# Patient Record
Sex: MALE | Race: WHITE | URBAN - METROPOLITAN AREA
[De-identification: names, ages, dates, MRNs, and addresses within clinical notes are randomized per-mention and may not be internally consistent; named-entity substitution may affect disease eponyms.]

---

## 2017-01-01 ENCOUNTER — RADIANT APPOINTMENT (OUTPATIENT)
Dept: CARDIOLOGY | Facility: CLINIC | Age: 72
End: 2017-01-01
Attending: FAMILY MEDICINE
Payer: COMMERCIAL

## 2017-01-01 ENCOUNTER — TELEPHONE (OUTPATIENT)
Dept: FAMILY MEDICINE | Facility: CLINIC | Age: 72
End: 2017-01-01

## 2017-01-01 ENCOUNTER — MEDICAL CORRESPONDENCE (OUTPATIENT)
Dept: HEALTH INFORMATION MANAGEMENT | Facility: CLINIC | Age: 72
End: 2017-01-01

## 2017-01-01 ENCOUNTER — TELEPHONE (OUTPATIENT)
Dept: NURSING | Facility: CLINIC | Age: 72
End: 2017-01-01

## 2017-01-01 ENCOUNTER — RADIANT APPOINTMENT (OUTPATIENT)
Dept: GENERAL RADIOLOGY | Facility: CLINIC | Age: 72
End: 2017-01-01
Attending: FAMILY MEDICINE
Payer: COMMERCIAL

## 2017-01-01 ENCOUNTER — OFFICE VISIT (OUTPATIENT)
Dept: URGENT CARE | Facility: URGENT CARE | Age: 72
End: 2017-01-01
Payer: COMMERCIAL

## 2017-01-01 ENCOUNTER — OFFICE VISIT (OUTPATIENT)
Dept: FAMILY MEDICINE | Facility: CLINIC | Age: 72
End: 2017-01-01
Payer: COMMERCIAL

## 2017-01-01 VITALS
SYSTOLIC BLOOD PRESSURE: 116 MMHG | OXYGEN SATURATION: 86 % | WEIGHT: 255 LBS | HEART RATE: 88 BPM | TEMPERATURE: 97 F | DIASTOLIC BLOOD PRESSURE: 64 MMHG | BODY MASS INDEX: 38.77 KG/M2

## 2017-01-01 VITALS
WEIGHT: 260 LBS | OXYGEN SATURATION: 90 % | SYSTOLIC BLOOD PRESSURE: 150 MMHG | TEMPERATURE: 97.7 F | BODY MASS INDEX: 39.53 KG/M2 | HEART RATE: 84 BPM | DIASTOLIC BLOOD PRESSURE: 70 MMHG

## 2017-01-01 VITALS
HEART RATE: 80 BPM | OXYGEN SATURATION: 95 % | BODY MASS INDEX: 38.7 KG/M2 | SYSTOLIC BLOOD PRESSURE: 140 MMHG | DIASTOLIC BLOOD PRESSURE: 69 MMHG | WEIGHT: 254.5 LBS | TEMPERATURE: 98.1 F

## 2017-01-01 VITALS
SYSTOLIC BLOOD PRESSURE: 117 MMHG | HEART RATE: 101 BPM | TEMPERATURE: 97.5 F | OXYGEN SATURATION: 91 % | DIASTOLIC BLOOD PRESSURE: 69 MMHG | BODY MASS INDEX: 36.49 KG/M2 | WEIGHT: 240 LBS

## 2017-01-01 VITALS
BODY MASS INDEX: 37.74 KG/M2 | SYSTOLIC BLOOD PRESSURE: 114 MMHG | HEIGHT: 68 IN | DIASTOLIC BLOOD PRESSURE: 46 MMHG | WEIGHT: 249 LBS | HEART RATE: 90 BPM | TEMPERATURE: 97.6 F | OXYGEN SATURATION: 91 %

## 2017-01-01 DIAGNOSIS — I10 ESSENTIAL HYPERTENSION WITH GOAL BLOOD PRESSURE LESS THAN 140/90: ICD-10-CM

## 2017-01-01 DIAGNOSIS — E11.21 TYPE 2 DIABETES MELLITUS WITH DIABETIC NEPHROPATHY, WITHOUT LONG-TERM CURRENT USE OF INSULIN (H): ICD-10-CM

## 2017-01-01 DIAGNOSIS — R06.2 WHEEZING: ICD-10-CM

## 2017-01-01 DIAGNOSIS — R05.9 COUGH: ICD-10-CM

## 2017-01-01 DIAGNOSIS — I10 HYPERTENSION GOAL BP (BLOOD PRESSURE) < 140/90: ICD-10-CM

## 2017-01-01 DIAGNOSIS — G47.09 OTHER INSOMNIA: ICD-10-CM

## 2017-01-01 DIAGNOSIS — J20.9 ACUTE BRONCHITIS, UNSPECIFIED ORGANISM: Primary | ICD-10-CM

## 2017-01-01 DIAGNOSIS — R06.02 SOB (SHORTNESS OF BREATH): ICD-10-CM

## 2017-01-01 DIAGNOSIS — M79.89 LEG SWELLING: ICD-10-CM

## 2017-01-01 DIAGNOSIS — I10 ESSENTIAL HYPERTENSION WITH GOAL BLOOD PRESSURE LESS THAN 140/90: Primary | ICD-10-CM

## 2017-01-01 DIAGNOSIS — R05.9 COUGH: Primary | ICD-10-CM

## 2017-01-01 DIAGNOSIS — L03.115 CELLULITIS OF RIGHT LOWER EXTREMITY: Primary | ICD-10-CM

## 2017-01-01 DIAGNOSIS — C45.7 MESOTHELIOMA OF LUNG (H): Primary | ICD-10-CM

## 2017-01-01 DIAGNOSIS — M62.81 GENERALIZED MUSCLE WEAKNESS: ICD-10-CM

## 2017-01-01 DIAGNOSIS — R60.0 PERIPHERAL EDEMA: ICD-10-CM

## 2017-01-01 LAB
ALBUMIN SERPL-MCNC: 3.5 G/DL (ref 3.4–5)
ANION GAP SERPL CALCULATED.3IONS-SCNC: 10 MMOL/L (ref 3–14)
BUN SERPL-MCNC: 8 MG/DL (ref 7–30)
CALCIUM SERPL-MCNC: 9.4 MG/DL (ref 8.5–10.1)
CHLORIDE SERPL-SCNC: 91 MMOL/L (ref 94–109)
CO2 SERPL-SCNC: 32 MMOL/L (ref 20–32)
CREAT SERPL-MCNC: 0.87 MG/DL (ref 0.66–1.25)
ERYTHROCYTE [DISTWIDTH] IN BLOOD BY AUTOMATED COUNT: 14.5 % (ref 10–15)
GFR SERPL CREATININE-BSD FRML MDRD: 86 ML/MIN/1.7M2
GLUCOSE SERPL-MCNC: 266 MG/DL (ref 70–99)
HBA1C MFR BLD: 6.9 % (ref 4.3–6)
HCT VFR BLD AUTO: 40.9 % (ref 40–53)
HGB BLD-MCNC: 13 G/DL (ref 13.3–17.7)
MCH RBC QN AUTO: 30.5 PG (ref 26.5–33)
MCHC RBC AUTO-ENTMCNC: 31.8 G/DL (ref 31.5–36.5)
MCV RBC AUTO: 96 FL (ref 78–100)
PHOSPHATE SERPL-MCNC: 2.6 MG/DL (ref 2.5–4.5)
PLATELET # BLD AUTO: 259 10E9/L (ref 150–450)
POTASSIUM SERPL-SCNC: 4.1 MMOL/L (ref 3.4–5.3)
RBC # BLD AUTO: 4.26 10E12/L (ref 4.4–5.9)
SODIUM SERPL-SCNC: 133 MMOL/L (ref 133–144)
WBC # BLD AUTO: 8.3 10E9/L (ref 4–11)

## 2017-01-01 PROCEDURE — 99495 TRANSJ CARE MGMT MOD F2F 14D: CPT | Performed by: FAMILY MEDICINE

## 2017-01-01 PROCEDURE — 99213 OFFICE O/P EST LOW 20 MIN: CPT | Performed by: FAMILY MEDICINE

## 2017-01-01 PROCEDURE — 99214 OFFICE O/P EST MOD 30 MIN: CPT | Performed by: FAMILY MEDICINE

## 2017-01-01 PROCEDURE — 85027 COMPLETE CBC AUTOMATED: CPT | Performed by: FAMILY MEDICINE

## 2017-01-01 PROCEDURE — 93306 TTE W/DOPPLER COMPLETE: CPT | Performed by: INTERNAL MEDICINE

## 2017-01-01 PROCEDURE — 71020 XR CHEST 2 VW: CPT

## 2017-01-01 PROCEDURE — 80069 RENAL FUNCTION PANEL: CPT | Performed by: FAMILY MEDICINE

## 2017-01-01 PROCEDURE — 83036 HEMOGLOBIN GLYCOSYLATED A1C: CPT | Performed by: FAMILY MEDICINE

## 2017-01-01 PROCEDURE — 40000264 ZZHC STATISTIC IV PUSH SINGLE INITIAL SUBSTANCE: Performed by: INTERNAL MEDICINE

## 2017-01-01 PROCEDURE — 36415 COLL VENOUS BLD VENIPUNCTURE: CPT | Performed by: FAMILY MEDICINE

## 2017-01-01 RX ORDER — FUROSEMIDE 20 MG
TABLET ORAL
Qty: 60 TABLET | Refills: 2 | Status: SHIPPED | OUTPATIENT
Start: 2017-01-01

## 2017-01-01 RX ORDER — PREDNISONE 10 MG/1
TABLET ORAL
Qty: 7 TABLET | Refills: 1 | Status: SHIPPED | OUTPATIENT
Start: 2017-01-01 | End: 2017-01-01

## 2017-01-01 RX ORDER — BISACODYL 10 MG
10 SUPPOSITORY, RECTAL RECTAL DAILY PRN
COMMUNITY

## 2017-01-01 RX ORDER — ALBUTEROL SULFATE 90 UG/1
2 AEROSOL, METERED RESPIRATORY (INHALATION) EVERY 6 HOURS PRN
Qty: 1 INHALER | Refills: 1 | Status: SHIPPED | OUTPATIENT
Start: 2017-01-01

## 2017-01-01 RX ORDER — LEVOFLOXACIN 500 MG/1
500 TABLET, FILM COATED ORAL DAILY
Qty: 7 TABLET | Refills: 0 | Status: SHIPPED | OUTPATIENT
Start: 2017-01-01 | End: 2017-01-01

## 2017-01-01 RX ORDER — FUROSEMIDE 20 MG
20 TABLET ORAL DAILY
Qty: 30 TABLET | Refills: 5 | Status: SHIPPED | OUTPATIENT
Start: 2017-01-01 | End: 2017-01-01

## 2017-01-01 RX ORDER — CEPHALEXIN 500 MG/1
500 CAPSULE ORAL 3 TIMES DAILY
Qty: 30 CAPSULE | Refills: 0 | Status: SHIPPED | OUTPATIENT
Start: 2017-01-01 | End: 2017-01-01

## 2017-01-01 RX ORDER — ZOLPIDEM TARTRATE 5 MG/1
5 TABLET ORAL
Qty: 15 TABLET | Refills: 2 | Status: SHIPPED | OUTPATIENT
Start: 2017-01-01

## 2017-01-01 RX ORDER — ASPIRIN 81 MG
100 TABLET, DELAYED RELEASE (ENTERIC COATED) ORAL 2 TIMES DAILY
COMMUNITY

## 2017-01-01 RX ORDER — OSELTAMIVIR PHOSPHATE 75 MG/1
75 CAPSULE ORAL
COMMUNITY
Start: 2017-01-01 | End: 2017-01-01

## 2017-01-01 RX ORDER — PREDNISONE 20 MG/1
20-40 TABLET ORAL
COMMUNITY
Start: 2017-01-01 | End: 2017-01-01

## 2017-01-01 RX ADMIN — Medication 7 ML: at 12:00

## 2017-02-15 NOTE — TELEPHONE ENCOUNTER
"Call Type: Triage Call    Presenting Problem: Caller reports that he  has a \"cold \" that  started yesterday and was going to wait to be seen in clinic but has  pressure in mid chest and feels shot of breath; no prior MI or  angina history but multiple risk factors.  Advised to be seen in ED  today; will comply and go to [facility] ED.  Triage Note:  Guideline Title: Chest Pain  Recommended Disposition: See Provider within 8 Hours  Original Inclination: Wanted to speak with a nurse  Override Disposition:  Intended Action: Go to Hospital / ED  Physician Contacted: No  Moderate to severe pain occurring with deep breath or a productive cough for one  full day or more ?  YES  Chest pain occurs only with swallowing ? NO  Symptoms worse when lying down AND better when sitting and leaning forward ? NO  Loss of consciousness for any period of time ? NO  New or worsening signs and symptoms that may indicate shock ? NO  Cardiac symptoms now or within last hour began after cocaine or methamphetamine  use ? NO  Current severe chest pain following chest injury in prior 48 hours ? NO  Currently having unexplained profound weakness or dizziness ? NO  Burning or tingling feeling, itchiness or stabbing pain in a localized area on one  side of body followed by reddened fluid-filled blisters that break and crust ? NO  Pains are sharp, stabbing, come and go (intermittent) AND last only a few seconds  (less than 10 seconds) and not previously evaluated ? NO  Frequent episodes of indigestion/reflux ? NO  Previously diagnosed angina AND symptoms have increased in frequency or severity ?  NO  Chest discomfort associated with shortness of breath, sweating, odd heartbeats or  different heart rate, nausea, vomiting, lightheadedness, or fainting lasting 5 or  more minutes now or within the last hour ? NO  Pain brought on by, or made worse by, pressure on a localized area (such as rib)  AND not previously evaluated ? NO  Pain brought on by, " or made worse by, movement or change of position (such as  pushing down on arms of chair to get up) AND not previously evaluated ? NO  Cardiac symptoms within last 24 hours AND known coronary artery disease (history  of myocardial infarction (MI), angina, percutaneous coronary intervention (PCI)  or cardiac surgery) ? NO  Chest pain spreading to the shoulders, neck, jaw, in one or both arms, stomach or  back lasting 5 or more minutes now or within the last hour. Pain is NOT  associated with taking a deep breath or a productive cough, movement, or touch to  a localized area. ? NO  Previously diagnosed angina AND symptoms not relieved by provider defined  treatment regime OR symptoms were relieved and returned ? NO  One or more occurrences of unexplained pain in shoulders, neck, jaw, in one or  both arms, stomach or back lasting more than a few minutes that has not been  evaluated by a healthcare provider and has risk factors for cardiovascular  disease. ? NO  Pressure, fullness, squeezing sensation or pain anywhere in the chest lasting 5 or  more minutes now or within the last hour. Pain is NOT associated with taking a  deep breath or a productive cough, movement, or touch to a localized area on the  chest. ? NO  Recurring episodes of hiccups or an episode that can't be stopped (more than 24  hours) ? NO  Evaluated by provider and has question/concern about their condition, treatment  plan, treatment options, follow-up appointments, or other follow-up care. ? NO  Sudden onset of shortness of breath, chest pain and cough with blood tinged sputum  ? NO  Chest pain now or within last hour AND known coronary artery disease (history of  myocardial infarction (MI), angina, percutaneous coronary intervention (PCI) or  cardiac surgery) ? NO  New onset or worsening shortness of breath or difficulty breathing ? NO  Physician Instructions:  Care Advice: Limit activities and increase periods of rest.  IMMEDIATE ACTION

## 2017-02-20 NOTE — PROGRESS NOTES
SUBJECTIVE:                                                    Oliver Mendoza is a 71 year old male who presents to clinic today for the following health issues:      Follow-up flu. Small pleural effusion. Renal panel/cbc ok. Given tamiflu and prednisone.   No MDI or prednisone in past. Smoking - greatly cut down. Rarely does.   Chantix/wellbutrin not helpful.   Patient might try nicotine patch. Patient believes can quit cold turkey.   50% better. Some wheezing/tightness. Nebs ?helpful.   No fevers or chills. No nausea, vomiting or diarrhea. Cough loosening up. Over the counter robitussin dm. Sinus improving.   Antibiotic initially given.   Not really checking blood sugars much. No chest pain.   Per hospital note:    Influenza A  Active Problems:  Type II diabetes mellitus   Hypertension  Hyperlipidemia  Recurrent Nephrolithiasis  Acute exacerbation of chronic obstructive pulmonary disease (COPD) (HC)  BRIEF HOSPITAL COURSE: This 71 y.o. male who was admitted with increasing shortness of breath and chest pain. He was initially hypoxic to 89% and required 4 liters of supplemental oxygen to maintain saturation, although technically he did not meet the definition of acute respiratory failure with hypoxia as he did not have multiple recorded saturations of less than 88%. He has not documented PULMONARY FUNCTION TESTS or diagnosis of COPD, but he was diffusely wheezy with prolonged expirations. He was started on prednisone and had improvement in symptoms. He was positive for influenza A and was started on tamiflu. He will discharge with a quick prednisone taper and complete a 5 day course of tamiflu. His oxygen saturations were adequate without supplemental oxygen.        Hospital Follow-up Visit:    Hospital/Nursing Home/ Rehab Facility: [facility]  Date of Admission: 02-  Date of Discharge: 02-  Reason(s) for Admission: flu and water in lung             Problems taking medications regularly:  None        Medication changes since discharge: None       Problems adhering to non-medication therapy:  None      Summary of hospitalization:  CareEverywhere information obtained and reviewed  Diagnostic Tests/Treatments reviewed.  Follow up needed: none  Other Healthcare Providers Involved in Patient s Care:         None  Update since discharge: stable.     Post Discharge Medication Reconciliation: discharge medications reconciled and changed, per note/orders (see AVS).  Plan of care communicated with patient     Coding guidelines for this visit:  Type of Medical   Decision Making Face-to-Face Visit       within 7 Days of discharge Face-to-Face Visit        within 14 days of discharge   Moderate Complexity [id number]   High Complexity [id number]            PROBLEMS TO ADD ON...    Problem list and histories reviewed & adjusted, as indicated.  Additional history: as documented    Patient Active Problem List   Diagnosis     Advanced directives, counseling/discussion     Type 2 diabetes, HbA1C goal < 8% (H)     Hyperlipidemia LDL goal <100     Hypertension goal BP (blood pressure) < 140/90     Leg swelling     Ganglion cyst of wrist     CTS (carpal tunnel syndrome)     Meibomitis     Cataract, nuclear     Microhematuria     Health Care Home     ARF (acute renal failure) (H)     Kidney stones     Skin lesion     Pain in joint, shoulder region     Pain in thoracic spine     History of basal cell carcinoma     ED (erectile dysfunction)     Cervical radiculopathy     Type 2 diabetes mellitus with diabetic nephropathy (H)     Erectile dysfunction due to arterial insufficiency     Calculus of kidney     Anemia due to other cause     Essential hypertension with goal blood pressure less than 140/90     Past Surgical History   Procedure Laterality Date     Cholecystectomy  1992     Hernia repair  1985     Umbilical hernia     Extracorporeal shock wave lithotripsy (eswl)         Social History   Substance Use Topics     Smoking status:  Former Smoker     Packs/day: 0.50     Types: Cigarettes     Quit date: 9/3/2013     Smokeless tobacco: Never Used      Comment: Lives in smoke free household     Alcohol use 0.0 oz/week     0 Standard drinks or equivalent per week      Comment:  one liter a week whiskey     Family History   Problem Relation Age of Onset     DIABETES Father      Hypertension Father      DIABETES Brother      Hypertension Brother      CANCER Mother      breast cancer - 70's     DIABETES Mother      Hypertension Mother      CEREBROVASCULAR DISEASE No family hx of      Thyroid Disease No family hx of      Glaucoma No family hx of      Macular Degeneration No family hx of            ROS:  C: NEGATIVE for fever, chills, change in weight  INTEGUMENTARY/SKIN: NEGATIVE for worrisome rashes  E/M: NEGATIVE for ear, mouth and throat problems  CV: NEGATIVE for chest pain, palpitations or peripheral edema  GI: NEGATIVE for nausea, abdominal pain, heartburn, or change in bowel habits  : negative for dysuria, hematuria, decreased urinary stream, erectile dysfunction  MUSCULOSKELETAL: NEGATIVE for significant arthralgias or myalgia  HEME/ALLERGY/IMMUNE: NEGATIVE for bleeding problems  PSYCHIATRIC: NEGATIVE for changes in mood or affect    OBJECTIVE:                                                    /70  Pulse 84  Temp 97.7  F (36.5  C) (Oral)  Wt 260 lb (117.9 kg)  SpO2 90%  BMI 39.53 kg/m2  Body mass index is 39.53 kg/(m^2).  GENERAL: healthy, alert and no distress  EYES: Eyes grossly normal to inspection, PERRL and conjunctivae and sclerae normal  HENT: ear canals and TM's normal, nose and mouth without ulcers or lesions  NECK: no adenopathy, no asymmetry, masses, or scars and thyroid normal to palpation  RESP: upper airway rales some crackles lung bases and diffuse wheezing. Good overall airflow  CV: regular rate and rhythm, normal S1 S2, no S3 or S4, no murmur, click or rub, no peripheral edema and peripheral pulses strong  ABDOMEN:  soft, nontender, no hepatosplenomegaly, no masses and bowel sounds normal  MS: no gross musculoskeletal defects noted, no edema  SKIN: no suspicious lesions or rashes  NEURO: Normal strength and tone, mentation intact and speech normal  PSYCH: mentation appears normal, affect normal/bright         ASSESSMENT/PLAN:                                                    ASSESSMENT / PLAN:  (J20.9) Acute bronchitis, unspecified organism  (primary encounter diagnosis)  Comment: wheezing and still some shortness of breath but improving overall and afebrile  Plan: predniSONE (DELTASONE) 10 MG tablet, albuterol         (PROAIR HFA/PROVENTIL HFA/VENTOLIN HFA) 108 (90        BASE) MCG/ACT Inhaler        Repeat xray in next month - return to clinic sooner if not continue improve. Will give short course more prednisone and add prn albuterol. Expected course and warning signs reviewed. Reveiwed risks and side effects of medication  Call/email with questions/concerns. To ER if worsening shortness of breath/ chest pain. Quit ALL smoking. Consider over the counter nicotines.     (E11.21) Type 2 diabetes mellitus with diabetic nephropathy, without long-term current use of insulin (H)  Comment: ok recently but not checking much  Plan: continue diet and montior with prednisone. Recheck in 3 months  Sooner if a lot worse.         Heriberto Lundy MD  [facility]

## 2017-02-20 NOTE — MR AVS SNAPSHOT
"              After Visit Summary   2017    Oliver Mendoza    MRN: [id number]           Patient Information     Date Of Birth          1945        Visit Information        Provider Department      2017 2:30 PM Heriberto Lundy MD [facility]        Today's Diagnoses     Acute bronchitis, unspecified organism    -  1    Type 2 diabetes mellitus with diabetic nephropathy, without long-term current use of insulin (H)           Follow-ups after your visit        Who to contact     If you have questions or need follow up information about today's clinic visit or your schedule please contact [facility] directly at [phone].  Normal or non-critical lab and imaging results will be communicated to you by [employer], letter or phone within 4 business days after the clinic has received the results. If you do not hear from us within 7 days, please contact the clinic through [employer] or phone. If you have a critical or abnormal lab result, we will notify you by phone as soon as possible.  Submit refill requests through [employer] or call your pharmacy and they will forward the refill request to us. Please allow 3 business days for your refill to be completed.          Additional Information About Your Visit        MyChart Information     [employer] lets you send messages to your doctor, view your test results, renew your prescriptions, schedule appointments and more. To sign up, go to [email or web address] . Click on \"Log in\" on the left side of the screen, which will take you to the Welcome page. Then click on \"Sign up Now\" on the right side of the page.     You will be asked to enter the access code listed below, as well as some personal information. Please follow the directions to create your username and password.     Your access code is: [email or web address]  Expires: 2017  7:51 AM     Your access code will  in 90 days. If you need help or a new code, please call your [facility] " [facility] or [phone].        Care EveryWhere ID     This is your Care EveryWhere ID. This could be used by other organizations to access your [place] medical records  [id number]        Your Vitals Were     Pulse Temperature Pulse Oximetry BMI (Body Mass Index)          84 97.7  F (36.5  C) (Oral) 90% 39.53 kg/m2         Blood Pressure from Last 3 Encounters:   02/20/17 150/70   11/18/16 125/71   03/18/16 139/74    Weight from Last 3 Encounters:   02/20/17 260 lb (117.9 kg)   11/18/16 260 lb (117.9 kg)   03/18/16 266 lb (120.7 kg)              Today, you had the following     No orders found for display         Today's Medication Changes          These changes are accurate as of: 2/20/17  2:59 PM.  If you have any questions, ask your nurse or doctor.               Start taking these medicines.        Dose/Directions    albuterol 108 (90 BASE) MCG/ACT Inhaler   Commonly known as:  PROAIR HFA/PROVENTIL HFA/VENTOLIN HFA   Used for:  Acute bronchitis, unspecified organism   Started by:  Heriberto Lundy MD        Dose:  2 puff   Inhale 2 puffs into the lungs every 6 hours as needed for shortness of breath / dyspnea or wheezing   Quantity:  1 Inhaler   Refills:  1         These medicines have changed or have updated prescriptions.        Dose/Directions    * predniSONE 20 MG tablet   Commonly known as:  DELTASONE   This may have changed:  Another medication with the same name was added. Make sure you understand how and when to take each.   Changed by:  Heriberto Lundy MD        Dose:  20-40 mg   Take 20-40 mg by mouth   Refills:  0       * predniSONE 10 MG tablet   Commonly known as:  DELTASONE   This may have changed:  You were already taking a medication with the same name, and this prescription was added. Make sure you understand how and when to take each.   Used for:  Acute bronchitis, unspecified organism   Changed by:  Heriberto Lundy MD        2 tabs x2 days, then 1 tab daily x3 days.   Quantity:  7  tablet   Refills:  1       * Notice:  This list has 2 medication(s) that are the same as other medications prescribed for you. Read the directions carefully, and ask your doctor or other care provider to review them with you.         Where to get your medicines      These medications [facility] [place], [place] [facility] [address]  [address] [address], [address], [facility] [address]     Phone:  [phone]     albuterol 108 (90 BASE) MCG/ACT Inhaler    predniSONE 10 MG tablet                Primary Care Provider Office Phone # Fax #    Heriberto Lundy [phone]       [facility] [address] [address] [address]        Thank you!     Thank you for choosing [facility]  for your care. Our goal is always to provide you with excellent care. Hearing back from our patients is one way we can continue to improve our services. Please take a few minutes to complete the written survey that you may receive in the mail after your visit with us. Thank you!             Your Updated Medication List - Protect others around you: Learn how to safely use, store and throw away your medicines at www.disposemymeds.org.          This list is accurate as of: 2/20/17  2:59 PM.  Always use your most recent med list.                   Brand Name Dispense Instructions for use    albuterol 108 (90 BASE) MCG/ACT Inhaler    PROAIR HFA/PROVENTIL HFA/VENTOLIN HFA    1 Inhaler    Inhale 2 puffs into the lungs every 6 hours as needed for shortness of breath / dyspnea or wheezing       allopurinol 100 MG tablet    ZYLOPRIM    90 tablet    Take 1 tablet (100 mg) by mouth daily To lower uric acid/prevent kidney stones Pharmacy ok to hold prescription until due       aspirin 81 MG tablet      Take 1 tablet by mouth daily.       fish oil-omega-3 fatty acids 1000 MG capsule      Take 3 g by mouth daily.       furosemide 20 MG tablet    LASIX    30 tablet    Take 1 tablet (20  mg) by mouth daily In am with breakfast for leg swelling/blood pressure       Glucosamine Sulfate 1000 MG Tabs      2 tabs daily       lisinopril 10 MG tablet    PRINIVIL/ZESTRIL    45 tablet    1/2 pill = 5mg In AM for blood pressure Pharmacy ok to hold prescription until due       metFORMIN 750 MG 24 hr tablet    GLUCOPHAGE-XR    90 tablet    Take 1 tablet (750 mg) by mouth daily (with breakfast) For diabetes Pharmacy ok to hold prescription until due       oseltamivir 75 MG capsule    TAMIFLU     Take 75 mg by mouth       oxyCODONE-acetaminophen 5-325 MG per tablet    PERCOCET    50 tablet    Take 1 tablet by mouth 2 times daily as needed for moderate to severe pain Max#50/month       * predniSONE 20 MG tablet    DELTASONE     Take 20-40 mg by mouth       * predniSONE 10 MG tablet    DELTASONE    7 tablet    2 tabs x2 days, then 1 tab daily x3 days.       simvastatin 10 MG tablet    ZOCOR    90 tablet    Take 1 tablet (10 mg) by mouth At Bedtime For cholesterol Pharmacy ok to hold prescription until due       STOOL SOFTENER PO      2 tabs daily       * Notice:  This list has 2 medication(s) that are the same as other medications prescribed for you. Read the directions carefully, and ask your doctor or other care provider to review them with you.

## 2017-02-20 NOTE — NURSING NOTE
"Chief Complaint   Patient presents with     Hospital F/U       Initial /70  Pulse 84  Temp 97.7  F (36.5  C) (Oral)  Wt 260 lb (117.9 kg)  SpO2 90%  BMI 39.53 kg/m2 Estimated body mass index is 39.53 kg/(m^2) as calculated from the following:    Height as of 7/1/15: 5' 8\" (1.727 m).    Weight as of this encounter: 260 lb (117.9 kg).  Medication Reconciliation: complete   Katya Caban CMA    "

## 2017-03-08 NOTE — TELEPHONE ENCOUNTER
Panel Management Review      Patient has the following on his problem list:     Diabetes    ASA: Passed    Last A1C  Lab Results   Component Value Date    A1C 6.9 11/14/2016    A1C 6.7 11/06/2015    A1C 7.4 05/13/2015    A1C 6.8 10/24/2014    A1C 6.0 05/02/2014     A1C tested: Passed    Last LDL:    Lab Results   Component Value Date    CHOL 130 04/20/2016     Lab Results   Component Value Date    HDL 28 04/20/2016     Lab Results   Component Value Date    LDL 71 04/20/2016     Lab Results   Component Value Date    TRIG 156 04/20/2016     Lab Results   Component Value Date    CHOLHDLRATIO 5.5 05/13/2015     Lab Results   Component Value Date    NHDL 102 04/20/2016       Is the patient on a Statin? YES             Is the patient on Aspirin? YES    Medications     HMG CoA Reductase Inhibitors    simvastatin (ZOCOR) 10 MG tablet    Salicylates    aspirin 81 MG tablet          Last three blood pressure readings:  BP Readings from Last 3 Encounters:   02/20/17 150/70   11/18/16 125/71   03/18/16 139/74       Date of last diabetes office visit: 02-     Tobacco History:     History   Smoking Status     Former Smoker     Packs/day: 0.50     Types: Cigarettes     Quit date: 9/3/2013   Smokeless Tobacco     Never Used     Comment: Lives in smoke free household         Hypertension   Last three blood pressure readings:  BP Readings from Last 3 Encounters:   02/20/17 150/70   11/18/16 125/71   03/18/16 139/74     Blood pressure: MONITOR    HTN Guidelines:  Age 18-59 BP range:  Less than 140/90  Age 60-85 with Diabetes:  Less than 140/90  Age 60-85 without Diabetes:  less than 150/90      Composite cancer screening  Chart review shows that this patient is due/due soon for the following None  Summary:    Patient is due/failing the following:   BP CHECK    Action needed:   none    Type of outreach:    none/ follow up in May 2017    Questions for provider review:    None                                                                                                                                     Katya Caban, [facility]     Chart routed to none .

## 2017-03-18 NOTE — MR AVS SNAPSHOT
After Visit Summary   3/18/2017    Oliver Mendoza    MRN: [id number]           Patient Information     Date Of Birth          1945        Visit Information        Provider Department      3/18/2017 11:00 AM Roddy Webb MD [facility]        Today's Diagnoses     Cellulitis of right lower extremity    -  1      Care Instructions      Cellulitis  Cellulitis is an infection of the deep layers of skin. A break in the skin, such as a cut or scratch, can let bacteria under the skin. If the bacteria get to deep layers of the skin, it can be serious. If not treated, cellulitis can get into the bloodstream and lymph nodes. The infection can then spread throughout the body. This causes serious illness.  Cellulitis causes the affected skin to become red, swollen, warm, and sore. The reddened areas have a visible border. An open sore may leak fluid (pus). You may have a fever, chills, and pain.  Cellulitis is treated with antibiotics taken for 7 to 10 days. An open sore may be cleaned and covered with cool wet gauze. Symptoms should get better 1 to 2 days after treatment is started. Make sure to take all the antibiotics for the full number of days until they are gone. Keep taking the medicine even if your symptoms go away.  Home care  Follow these tips:    Limit the use of the part of your body with cellulitis. Movement can cause the infection to spread.    If the infection is on your leg, walk as little as possible in the first few days of the treatment. Keep your leg raised while sitting. This will help to reduce swelling.    Take all of the antibiotic medicine exactly as directed until it is gone. Do not miss any doses, especially during the first 7 days. Don t stop taking the medicine when your symptoms get better.    Keep the affected area clean and dry.    Wash your hands with soap and warm water before and after touching your skin. Anyone else who touches your skin should also wash his  "or her hands. Don't share towels.  Follow-up care  Follow up with your healthcare provider. If your infection does not go away on 1 antibiotic, your healthcare provider will prescribe a different one.  When to seek medical advice  Call your healthcare provider right away if any of these occur:    Red areas that spread    Swelling or pain that gets worse    Fluid leaking from the skin (pus)    Fever higher of 100.4  F (38.0  C) or higher after 2 days on antibiotics    7184-5445 [employer]. [address] [place] [address]. All rights reserved. This information is not intended as a substitute for professional medical care. Always follow your healthcare professional's instructions.              Follow-ups after your visit        Who to contact     If you have questions or need follow up information about today's clinic visit or your schedule please contact [facility] directly at [phone].  Normal or non-critical lab and imaging results will be communicated to you by MyChart, letter or phone within 4 business days after the clinic has received the results. If you do not hear from us within 7 days, please contact the clinic through Jasperhart or phone. If you have a critical or abnormal lab result, we will notify you by phone as soon as possible.  Submit refill requests through Prestadero or call your pharmacy and they will forward the refill request to us. Please allow 3 business days for your refill to be completed.          Additional Information About Your Visit        Jasperhart Information     Prestadero lets you send messages to your doctor, view your test results, renew your prescriptions, schedule appointments and more. To sign up, go to [email or web address] . Click on \"Log in\" on the left side of the screen, which will take you to the Welcome page. Then click on \"Sign up Now\" on the right side of the page.     You will be asked to enter the access code listed below, as well as " some personal information. Please follow the directions to create your username and password.     Your access code is: [email or web address]  Expires: 2017  8:51 AM     Your access code will  in 90 days. If you need help or a new code, please call your [facility] or [phone].        Care EveryWhere ID     This is your Care EveryWhere ID. This could be used by other organizations to access your [facility] medical records  [id number]        Your Vitals Were     Pulse Temperature Pulse Oximetry BMI (Body Mass Index)          80 98.1  F (36.7  C) (Tympanic) 95% 38.7 kg/m2         Blood Pressure from Last 3 Encounters:   17 140/69   17 150/70   16 125/71    Weight from Last 3 Encounters:   17 254 lb 8 oz (115.4 kg)   17 260 lb (117.9 kg)   16 260 lb (117.9 kg)              Today, you had the following     No orders found for display         Today's Medication Changes          These changes are accurate as of: 3/18/17 12:25 PM.  If you have any questions, ask your nurse or doctor.               Start taking these medicines.        Dose/Directions    cephALEXin 500 MG capsule   Commonly known as:  KEFLEX   Used for:  Cellulitis of right lower extremity   Started by:  Roddy Webb MD        Dose:  500 mg   Take 1 capsule (500 mg) by mouth 3 times daily for 10 days   Quantity:  30 capsule   Refills:  0            Where to get your medicines      These medications were sent to [facility] [address], [address]  [address] [address] [facility] [address], [facility] [address]     Phone:  [phone]     cephALEXin 500 MG capsule                Primary Care Provider Office Phone # Fax #    Heriberto Lundy [phone]       [address] [address]        Thank you!     Thank you for choosing [facility]  for your care. Our goal is always to provide you with excellent care. Hearing back from  our patients is one way we can continue to improve our services. Please take a few minutes to complete the written survey that you may receive in the mail after your visit with us. Thank you!             Your Updated Medication List - Protect others around you: Learn how to safely use, store and throw away your medicines at www.disposemymeds.org.          This list is accurate as of: 3/18/17 12:25 PM.  Always use your most recent med list.                   Brand Name Dispense Instructions for use    albuterol 108 (90 BASE) MCG/ACT Inhaler    PROAIR HFA/PROVENTIL HFA/VENTOLIN HFA    1 Inhaler    Inhale 2 puffs into the lungs every 6 hours as needed for shortness of breath / dyspnea or wheezing       allopurinol 100 MG tablet    ZYLOPRIM    90 tablet    Take 1 tablet (100 mg) by mouth daily To lower uric acid/prevent kidney stones Pharmacy ok to hold prescription until due       aspirin 81 MG tablet      Take 1 tablet by mouth daily.       cephALEXin 500 MG capsule    KEFLEX    30 capsule    Take 1 capsule (500 mg) by mouth 3 times daily for 10 days       fish oil-omega-3 fatty acids 1000 MG capsule      Take 3 g by mouth daily.       furosemide 20 MG tablet    LASIX    30 tablet    Take 1 tablet (20 mg) by mouth daily In am with breakfast for leg swelling/blood pressure       Glucosamine Sulfate 1000 MG Tabs      2 tabs daily       lisinopril 10 MG tablet    PRINIVIL/ZESTRIL    45 tablet    1/2 pill = 5mg In AM for blood pressure Pharmacy ok to hold prescription until due       metFORMIN 750 MG 24 hr tablet    GLUCOPHAGE-XR    90 tablet    Take 1 tablet (750 mg) by mouth daily (with breakfast) For diabetes Pharmacy ok to hold prescription until due       oxyCODONE-acetaminophen 5-325 MG per tablet    PERCOCET    50 tablet    Take 1 tablet by mouth 2 times daily as needed for moderate to severe pain Max#50/month       simvastatin 10 MG tablet    ZOCOR    90 tablet    Take 1 tablet (10 mg) by mouth At Bedtime For  cholesterol Pharmacy ok to hold prescription until due       STOOL SOFTENER PO      2 tabs daily

## 2017-03-18 NOTE — PATIENT INSTRUCTIONS
Cellulitis  Cellulitis is an infection of the deep layers of skin. A break in the skin, such as a cut or scratch, can let bacteria under the skin. If the bacteria get to deep layers of the skin, it can be serious. If not treated, cellulitis can get into the bloodstream and lymph nodes. The infection can then spread throughout the body. This causes serious illness.  Cellulitis causes the affected skin to become red, swollen, warm, and sore. The reddened areas have a visible border. An open sore may leak fluid (pus). You may have a fever, chills, and pain.  Cellulitis is treated with antibiotics taken for 7 to 10 days. An open sore may be cleaned and covered with cool wet gauze. Symptoms should get better 1 to 2 days after treatment is started. Make sure to take all the antibiotics for the full number of days until they are gone. Keep taking the medicine even if your symptoms go away.  Home care  Follow these tips:    Limit the use of the part of your body with cellulitis. Movement can cause the infection to spread.    If the infection is on your leg, walk as little as possible in the first few days of the treatment. Keep your leg raised while sitting. This will help to reduce swelling.    Take all of the antibiotic medicine exactly as directed until it is gone. Do not miss any doses, especially during the first 7 days. Don t stop taking the medicine when your symptoms get better.    Keep the affected area clean and dry.    Wash your hands with soap and warm water before and after touching your skin. Anyone else who touches your skin should also wash his or her hands. Don't share towels.  Follow-up care  Follow up with your healthcare provider. If your infection does not go away on 1 antibiotic, your healthcare provider will prescribe a different one.  When to seek medical advice  Call your healthcare provider right away if any of these occur:    Red areas that spread    Swelling or pain that gets worse    Fluid  leaking from the skin (pus)    Fever higher of 100.4  F (38.0  C) or higher after 2 days on antibiotics    9295-3269 [employer]. [address] [place] [address], [place] [address]. All rights reserved. This information is not intended as a substitute for professional medical care. Always follow your healthcare professional's instructions.

## 2017-03-18 NOTE — NURSING NOTE
"Chief Complaint   Patient presents with     Leg Pain     scrapped right foot 10 days ago. anstiseptic salve with no relief.        Initial /69  Pulse 80  Temp 98.1  F (36.7  C) (Tympanic)  Wt 254 lb 8 oz (115.4 kg)  SpO2 95%  BMI 38.7 kg/m2 Estimated body mass index is 38.7 kg/(m^2) as calculated from the following:    Height as of 7/1/15: 5' 8\" (1.727 m).    Weight as of this encounter: 254 lb 8 oz (115.4 kg).  Medication Reconciliation: complete       SERA Garcia      "

## 2017-03-22 NOTE — PROGRESS NOTES
SUBJECTIVE:  Oliver Mendoza, a 71 year old male scheduled an appointment to discuss the following issues:  Cellulitis of right lower extremity    Medical, social, surgical, and family histories reviewed.    Leg Pain  scrapped right foot 10 days ago. anstiseptic salve with no relief.       Pt is diabetic on Metformin.  No hx of MRSA.  No difficulty walking; has a scrapped area anterior right lower leg---mild surrounding redness and tenderness, no pus or drainage.    ROS:  See HPI.  No nausea/vomiting.  No fever/chills.  No chest pain/SOB.  No BM/urine problems.  No dizziness or syncope.      OBJECTIVE:  /69  Pulse 80  Temp 98.1  F (36.7  C) (Tympanic)  Wt 254 lb 8 oz (115.4 kg)  SpO2 95%  BMI 38.7 kg/m2  EXAM:  GENERAL APPEARANCE: alert and mild distress  EYES: Eyes grossly normal to inspection, PERRL and conjunctivae and sclerae normal  HENT: ear canals and TM's normal and nose and mouth without ulcers or lesions  NECK: no adenopathy, no asymmetry, masses, or scars and thyroid normal to palpation  RESP: lungs clear to auscultation - no rales, rhonchi or wheezes  CV: regular rates and rhythm, normal S1 S2, no S3 or S4 and no murmur, click or rub  LYMPHATICS: normal ant/post cervical and supraclavicular nodes  ABDOMEN: soft, nontender, without hepatosplenomegaly or masses and bowel sounds normal  MS: extremities --- no gross deformities noted; normal gait  SKIN: anterior right lower leg sore noted---mild surrounding redness and tenderness suggestive of early cellulitis;  no other suspicious lesions or rashes  NEURO: Normal strength and tone, mentation intact and speech normal      ASSESSMENT/PLAN:  (L03.115) Cellulitis of right lower extremity  (primary encounter diagnosis)  Plan: cephALEXin (KEFLEX) 500 MG capsule   wound care instructions given.  Pt to f/up PCP within 5 days, sooner if no improvement or worsening.  Warning signs and symptoms explained---be seen ASAP if worsening.

## 2017-04-26 NOTE — TELEPHONE ENCOUNTER
Can Oliver be worked in with Dr Lundy on Thursday 4/27?  He is having lung trouble again.  He spoke with [facility] Nurse Advisor who said he should be seen within 24 hours.  All other scheduling options have been exhausted.  Patient requests appointment with provider.

## 2017-04-26 NOTE — TELEPHONE ENCOUNTER
"Call Type: Triage Call    Presenting Problem: Oliver is having a \"hard time catching breath\"  when doing activity.  Oliver is having \"edema in feet\" and this  started just recently.  [facility] Triage/Edema/disposition is to be seen  within 24 hours and Oliver agreed.  Triage Note:  Guideline Title: Edema, Atraumatic  Recommended Disposition: See Provider within 24 hours  Original Inclination: Wanted to speak with a nurse  Override Disposition:  Intended Action: Call PCP/HCP  Physician Contacted: No  Edema newly worse than usual pattern OR newly developed in last 12 hours ?  YES  Pregnant ? NO  Difficulty breathing ? NO  New or worsening shortness of breath/difficulty breathing AND any other cardiac  signs/symptoms for more than 5 minutes, now or within last hour ? NO  Swelling in extremity post trauma ? NO  Swelling in extremity post trauma ? NO  Swelling in extremity post trauma ? NO  Swelling in extremity post trauma ? NO  Swelling in extremity post trauma ? NO  Swelling in extremity post trauma ? NO  Swelling after insect bite/sting ? NO  Previously diagnosed angina AND symptoms have increased in frequency or severity ?  NO  Known heart failure (HF) and new onset of palpitations or irregular pulse ? NO  Worsening breathing problems AND known cardiac or respiratory condition (coronary  artery disease, angina, heart failure, asthma, chronic bronchitis, COPD) NOT  responding to treatment OR treatment not available. ? NO  New or worsening shortness of breath/difficulty breathing AND new onset of  fatigue, weakness, confusion, or change in ability to care out daily activities ?  NO  Chest pain in last 24 hours ? NO  New or worsening fatigue, weakness, confusion, or change in function. ? NO  New or worsening edema and recent unexplained illness ? NO  Swelling came on in a matter of minutes ? NO  New or worsening one-sided leg swelling with pain that may be described as achy;  pain may worsen with standing or walking. ? " NO  New onset or worsening shortness of breath or difficulty breathing ? NO  Physician Instructions:  Care Advice: Call provider if symptoms worsen or new symptoms develop.  IMMEDIATE ACTION  CAUTIONS  Rest in a reclining chair or elevate head and chest on 2 or 3 pillows when  lying down to make breathing easier.  Go to ED IMMEDIATELY if developing increased shortness of breath,  continuous cough, worsening fatigue, or unable to perform ADLs.  SYMPTOM / CONDITION MANAGEMENT  List, or take, all current prescription(s), nonprescription or alternative  medication(s) to provider for evaluation.  LEG CARE: - Avoid prolonged sitting or standing  take a break to move around every hour or so. - Keep legs raised when  sitting, resting or sleeping  when possible raise legs above level of the heart for 20 -30 minutes. -  Flex and extend ankles for 10-12 repetitions every hour if sitting. - Do  not cross your legs. - Wear loose, non-restrictive clothing, especially  around waist, groin area and legs. - Consider using support hose if  recommended by your provider.

## 2017-04-27 NOTE — NURSING NOTE
"Chief Complaint   Patient presents with     Breathing Problem       Initial /64  Pulse 88  Temp 97  F (36.1  C) (Oral)  Wt 255 lb (115.7 kg)  SpO2 (!) 86%  BMI 38.77 kg/m2 Estimated body mass index is 38.77 kg/(m^2) as calculated from the following:    Height as of 7/1/15: 5' 8\" (1.727 m).    Weight as of this encounter: 255 lb (115.7 kg).  Medication Reconciliation: complete   Katya Caban CMA      "

## 2017-04-27 NOTE — MR AVS SNAPSHOT
"              After Visit Summary   2017    Oliver Mendoza    MRN: [id number]           Patient Information     Date Of Birth          1945        Visit Information        Provider Department      2017 8:00 AM Heriberto Lundy MD [facility]        Today's Diagnoses     Cough    -  1    Wheezing        Leg swelling        Hypertension goal BP (blood pressure) < 140/90           Follow-ups after your visit        Who to contact     If you have questions or need follow up information about today's clinic visit or your schedule please contact [facility] directly at [phone].  Normal or non-critical lab and imaging results will be communicated to you by Bookyahart, letter or phone within 4 business days after the clinic has received the results. If you do not hear from us within 7 days, please contact the clinic through Bookyahart or phone. If you have a critical or abnormal lab result, we will notify you by phone as soon as possible.  Submit refill requests through Amlogic or call your pharmacy and they will forward the refill request to us. Please allow 3 business days for your refill to be completed.          Additional Information About Your Visit        MyChart Information     Amlogic lets you send messages to your doctor, view your test results, renew your prescriptions, schedule appointments and more. To sign up, go to [email or web address] . Click on \"Log in\" on the left side of the screen, which will take you to the Welcome page. Then click on \"Sign up Now\" on the right side of the page.     You will be asked to enter the access code listed below, as well as some personal information. Please follow the directions to create your username and password.     Your access code is: [email or web address]  Expires: 2017  8:51 AM     Your access code will  in 90 days. If you need help or a new code, please call your [facility] or [phone].        Care EveryWhere ID  "    This is your Care EveryWhere ID. This could be used by other organizations to access your [facility] medical records  [id number]        Your Vitals Were     Pulse Temperature Pulse Oximetry BMI (Body Mass Index)          88 97  F (36.1  C) (Oral) 86% 38.77 kg/m2         Blood Pressure from Last 3 Encounters:   04/27/17 116/64   03/18/17 140/69   02/20/17 150/70    Weight from Last 3 Encounters:   04/27/17 255 lb (115.7 kg)   03/18/17 254 lb 8 oz (115.4 kg)   02/20/17 260 lb (117.9 kg)                 Today's Medication Changes          These changes are accurate as of: 4/27/17  8:48 AM.  If you have any questions, ask your nurse or doctor.               Start taking these medicines.        Dose/Directions    levofloxacin 500 MG tablet   Commonly known as:  LEVAQUIN   Used for:  Cough   Started by:  Heriberto Lundy MD        Dose:  500 mg   Take 1 tablet (500 mg) by mouth daily   Quantity:  7 tablet   Refills:  0            Where to get your medicines      These medications were sent to [facility] [address]  [id number] [place] [address]     Phone:  [phone]     furosemide 20 MG tablet    levofloxacin 500 MG tablet                Primary Care Provider Office Phone # Fax #    Heriberto Lundy [phone]       [address] [address]        Thank you!     Thank you for choosing [facility]  for your care. Our goal is always to provide you with excellent care. Hearing back from our patients is one way we can continue to improve our services. Please take a few minutes to complete the written survey that you may receive in the mail after your visit with us. Thank you!             Your Updated Medication List - Protect others around you: Learn how to safely use, store and throw away your medicines at www.disposemymeds.org.          This list is accurate as of: 4/27/17  8:48 AM.   Always use your most recent med list.                   Brand Name Dispense Instructions for use    albuterol 108 (90 BASE) MCG/ACT Inhaler    PROAIR HFA/PROVENTIL HFA/VENTOLIN HFA    1 Inhaler    Inhale 2 puffs into the lungs every 6 hours as needed for shortness of breath / dyspnea or wheezing       allopurinol 100 MG tablet    ZYLOPRIM    90 tablet    Take 1 tablet (100 mg) by mouth daily To lower uric acid/prevent kidney stones Pharmacy ok to hold prescription until due       aspirin 81 MG tablet      Take 1 tablet by mouth daily.       fish oil-omega-3 fatty acids 1000 MG capsule      Take 3 g by mouth daily.       furosemide 20 MG tablet    LASIX    30 tablet    Take 1 tablet (20 mg) by mouth daily In am with breakfast for leg swelling/blood pressure       Glucosamine Sulfate 1000 MG Tabs      2 tabs daily       levofloxacin 500 MG tablet    LEVAQUIN    7 tablet    Take 1 tablet (500 mg) by mouth daily       lisinopril 10 MG tablet    PRINIVIL/ZESTRIL    45 tablet    1/2 pill = 5mg In AM for blood pressure Pharmacy ok to hold prescription until due       metFORMIN 750 MG 24 hr tablet    GLUCOPHAGE-XR    90 tablet    Take 1 tablet (750 mg) by mouth daily (with breakfast) For diabetes Pharmacy ok to hold prescription until due       oxyCODONE-acetaminophen 5-325 MG per tablet    PERCOCET    50 tablet    Take 1 tablet by mouth 2 times daily as needed for moderate to severe pain Max#50/month       simvastatin 10 MG tablet    ZOCOR    90 tablet    Take 1 tablet (10 mg) by mouth At Bedtime For cholesterol Pharmacy ok to hold prescription until due       STOOL SOFTENER PO      2 tabs daily

## 2017-05-11 PROBLEM — E11.21 TYPE 2 DIABETES MELLITUS WITH DIABETIC NEPHROPATHY, WITHOUT LONG-TERM CURRENT USE OF INSULIN (H): Status: ACTIVE | Noted: 2017-01-01

## 2017-05-11 NOTE — PROGRESS NOTES
"SUBJECTIVE:  Oliver Mendoza, a 71 year old male scheduled an appointment to discuss the following issues:  Follow-up pneumonia - given levaquin/albuterol and lasix prn for edema. Did have prednisone 2 months ago.  Slightly better. No fevers or chills. Edema slightly better. Occasionally phlegm cough - post-nasal. No chest pain. No urine changes or hematuria.   Still shortness of breath. Albuterol and prednisone not helpful. No stress test in past 10 years.   No nausea, vomiting or diarrhea. No black or bloody stools. Taking lasix every AM.   Per xray 3 weeks ago:  IMPRESSION: There is a moderate-sized right pleural effusion with  underlying infiltrate or atelectasis in the right lung base. The left  lung is clear. Cardiac silhouette is stable  Medical, social, surgical, and family histories reviewed.      OBJECTIVE:  /46  Pulse 90  Temp 97.6  F (36.4  C) (Oral)  Ht 5' 8\" (1.727 m)  Wt 249 lb (112.9 kg)  SpO2 91%  BMI 37.86 kg/m2  EXAM:  GENERAL APPEARANCE: healthy, alert and no distress  EYES: EOMI,  PERRL  HENT: ear canals and TM's normal and nose and mouth without ulcers or lesions  NECK: no adenopathy, no asymmetry, masses, or scars and thyroid normal to palpation  RESP: decreased breath sounds right base. Good overall airflow  CV: regular rates and rhythm, normal S1 S2, no S3 or S4 and no murmur, click or rub -  ABDOMEN:  soft, nontender, no HSM or masses and bowel sounds normal  MS: trace bilateral LOWER EXTREMETIES edema  PSYCH: mentation appears normal and affect normal/bright      ASSESSMENT / PLAN:  (I10) Essential hypertension with goal blood pressure less than 140/90  (primary encounter diagnosis)  Comment: stable  Plan: Renal panel (Alb, BUN, Ca, Cl, CO2, Creat,         Gluc, Phos, K, Na), Echocardiogram Complete        Continue lasix prn. Check echo. Recheck in 3 months      (E11.21) Type 2 diabetes mellitus with diabetic nephropathy, without long-term current use of insulin (H)  Comment: " stable  Plan: Hemoglobin A1c, Renal panel (Alb, BUN, Ca, Cl,         CO2, Creat, Gluc, Phos, K, Na)        Continue diet/exercise as tolerated. Recheck in 3 months  Fasting in 3 months. Sooner if worse.     (R06.02) SOB (shortness of breath)  Comment: likely from pleural effusion. Overall a little better  Plan: CBC with platelets, Echocardiogram Complete,         PULMONARY MEDICINE REFERRAL        Will check echo ( patient not interested in stress test but might need if abnormal echo). To ER if worsening shortness of breath. Prn albuterol. Follow-up pulmonology - set-up both by MA. Call/email with questions/concerns. Expected course and warning signs reviewed.     (R05) Cough  Comment: overall improving. Afebrile/ wbc ok.   Plan: CBC with platelets, PULMONARY MEDICINE REFERRAL        Might needs ct scan. At least repeat chest xray in next month but will ask for pulmonology help. ?tap.     Heriberto Lundy MD

## 2017-05-11 NOTE — NURSING NOTE
"Chief Complaint   Patient presents with     Cough       Initial /46  Pulse 90  Temp 97.6  F (36.4  C) (Oral)  Ht 5' 8\" (1.727 m)  Wt 249 lb (112.9 kg)  SpO2 91%  BMI 37.86 kg/m2 Estimated body mass index is 37.86 kg/(m^2) as calculated from the following:    Height as of this encounter: 5' 8\" (1.727 m).    Weight as of this encounter: 249 lb (112.9 kg).  Medication Reconciliation: complete   Katya Caban CMA    "

## 2017-05-11 NOTE — MR AVS SNAPSHOT
After Visit Summary   5/11/2017    Oliver Mendoza    MRN: [id number]           Patient Information     Date Of Birth          1945        Visit Information        Provider Department      5/11/2017 8:20 AM Heriberto Lundy MD [facility]        Today's Diagnoses     Essential hypertension with goal blood pressure less than 140/90    -  1    Type 2 diabetes mellitus with diabetic nephropathy, without long-term current use of insulin (H)        SOB (shortness of breath)        Cough           Follow-ups after your visit        Additional Services     PULMONARY MEDICINE REFERRAL       Your provider has referred you to: FMG: [facility] [place] [phone]   [email or web address]  UMP: [facility] - [place] [phone]   [email or web address]  FHN: [facility] [phone]   [email or web address]    Please be aware that coverage of these services is subject to the terms and limitations of your health insurance plan.  Call member services at your health plan with any benefit or coverage questions.      Please bring the following with you to your appointment:    (1) Any X-Rays, CTs or MRIs which have been performed.  Contact the facility where they were done to arrange for  prior to your scheduled appointment.    (2) List of current medications   (3) This referral request   (4) Any documents/labs given to you for this referral                  Future tests that were ordered for you today     Open Future Orders        Priority Expected Expires Ordered    Echocardiogram Complete Routine  5/11/2018 5/11/2017            Who to contact     If you have questions or need follow up information about today's clinic visit or your schedule please contact [facility] directly at [phone].  Normal or non-critical  "lab and imaging results will be communicated to you by MyChart, letter or phone within 4 business days after the clinic has received the results. If you do not hear from us within 7 days, please contact the clinic through [facility] or phone. If you have a critical or abnormal lab result, we will notify you by phone as soon as possible.  Submit refill requests through [employer] or call your pharmacy and they will forward the refill request to us. Please allow 3 business days for your refill to be completed.          Additional Information About Your Visit        [employer] Information     [employer] lets you send messages to your doctor, view your test results, renew your prescriptions, schedule appointments and more. To sign up, go to [email or web address] . Click on \"Log in\" on the left side of the screen, which will take you to the Welcome page. Then click on \"Sign up Now\" on the right side of the page.     You will be asked to enter the access code listed below, as well as some personal information. Please follow the directions to create your username and password.     Your access code is: [email or web address]  Expires: 2017  8:51 AM     Your access code will  in 90 days. If you need help or a new code, please call your [place] clinic or [phone].        Care EveryWhere ID     This is your Care EveryWhere ID. This could be used by other organizations to access your [place] medical records  [id number]        Your Vitals Were     Pulse Temperature Height Pulse Oximetry BMI (Body Mass Index)       90 97.6  F (36.4  C) (Oral) 5' 8\" (1.727 m) 91% 37.86 kg/m2        Blood Pressure from Last 3 Encounters:   17 114/46   17 116/64   17 140/69    Weight from Last 3 Encounters:   17 249 lb (112.9 kg)   17 255 lb (115.7 kg)   17 254 lb 8 oz (115.4 kg)              We Performed the Following     CBC with platelets     Hemoglobin A1c     PULMONARY MEDICINE REFERRAL     Renal panel (Alb, " BUN, Ca, Cl, CO2, Creat, Gluc, Phos, K, Na)        Primary Care Provider Office Phone # Fax #    Heriberto Lundy [phone]       [facility] [address] [address] [facility] [address]        Thank you!     Thank you for choosing [facility]  for your care. Our goal is always to provide you with excellent care. Hearing back from our patients is one way we can continue to improve our services. Please take a few minutes to complete the written survey that you may receive in the mail after your visit with us. Thank you!             Your Updated Medication List - Protect others around you: Learn how to safely use, store and throw away your medicines at www.disposemymeds.org.          This list is accurate as of: 5/11/17  9:10 AM.  Always use your most recent med list.                   Brand Name Dispense Instructions for use    albuterol 108 (90 BASE) MCG/ACT Inhaler    PROAIR HFA/PROVENTIL HFA/VENTOLIN HFA    1 Inhaler    Inhale 2 puffs into the lungs every 6 hours as needed for shortness of breath / dyspnea or wheezing       allopurinol 100 MG tablet    ZYLOPRIM    90 tablet    Take 1 tablet (100 mg) by mouth daily To lower uric acid/prevent kidney stones Pharmacy ok to hold prescription until due       aspirin 81 MG tablet      Take 1 tablet by mouth daily.       fish oil-omega-3 fatty acids 1000 MG capsule      Take 3 g by mouth daily.       furosemide 20 MG tablet    LASIX    30 tablet    Take 1 tablet (20 mg) by mouth daily In am with breakfast for leg swelling/blood pressure       Glucosamine Sulfate 1000 MG Tabs      2 tabs daily       lisinopril 10 MG tablet    PRINIVIL/ZESTRIL    45 tablet    1/2 pill = 5mg In AM for blood pressure Pharmacy ok to hold prescription until due       metFORMIN 750 MG 24 hr tablet    GLUCOPHAGE-XR    90 tablet    Take 1 tablet (750 mg) by mouth daily (with breakfast) For diabetes Pharmacy ok to hold prescription until due        oxyCODONE-acetaminophen 5-325 MG per tablet    PERCOCET    50 tablet    Take 1 tablet by mouth 2 times daily as needed for moderate to severe pain Max#50/month       simvastatin 10 MG tablet    ZOCOR    90 tablet    Take 1 tablet (10 mg) by mouth At Bedtime For cholesterol Pharmacy ok to hold prescription until due       STOOL SOFTENER PO      2 tabs daily

## 2017-05-19 NOTE — TELEPHONE ENCOUNTER
Notes Recorded by Heriberto Lundy MD on 5/16/2017 at 10:12 AM  Generally normal heart function and heart valves. Follow-up lung specialist (pulmonology as discussed). please send letter to patient with results.     Patient:  Oliver Mendoza

## 2017-05-19 NOTE — TELEPHONE ENCOUNTER
Pt notified of provider message as written.  Pt verbalized good understanding.  Katya Conde RN

## 2017-05-19 NOTE — TELEPHONE ENCOUNTER
Patient states he would like to know the results of his echocardiogram.  Please call.    Thank you.

## 2017-05-30 NOTE — TELEPHONE ENCOUNTER
Son Timi is calling stating patient was in ER [facility] admit: 05/21 - 05/27 for respiratory failure and mesothelioma. Offered other providers,dates and times but son declined stating patient would just rather see pcp. Requesting to be fit in as soon as possible. Please call to advise. Thank you.

## 2017-05-30 NOTE — TELEPHONE ENCOUNTER
Called and spoke to son, appointment made for hospital follow up for next week.    RN, does a hospital f/u/call need to be made?    Harper Anderson MA/TC

## 2017-05-31 NOTE — TELEPHONE ENCOUNTER
"  ED for acute condition Discharge Protocol    \"Hi, my name is Katya Conde, a registered nurse, and I am calling from [facility]  I am calling to follow up and see how things are going for you after your recent emergency visit.\"    Tell me how you are doing now that you are home?\" I was dx with mesothelioma and won't be long for this world now.  Pt states he is doing ok now.      Discharge Instructions    \"Let's review your discharge instructions.  What is/are the follow-up recommendations?  Pt. Response: follow up appointment in one week.  I have home care nurse coming later today.    \"Has an appointment with your primary care provider been scheduled?\"  Yes. (confirm and remind to bring meds)    Medications    \"Tell me what changed about your medicines when you discharged?\"    No questions    \"What questions do you have about your medications?\"   None      Call Summary    \"What questions or concerns do you have about your recent visit and your follow-up care?\"     none    \"If you have questions or things don't continue to improve, we encourage you contact us through the main clinic number (give number).  Even if the clinic is not open, triage nurses are available 24/7 to help you.     We would like you to know that our clinic has extended hours (provide information).  We also have urgent care (provide details on closest location and hours/contact info)\"    \"Thank you for your time and take care!\"    Katya Conde RN              "

## 2017-06-02 NOTE — TELEPHONE ENCOUNTER
Jennifer notified of provider message as written.  Advised pt has refills available at our clinic pharmacy.  Katya Conde RN

## 2017-06-02 NOTE — TELEPHONE ENCOUNTER
Jennifer home care nurse calling to ask if pcp would like patient to go back on furosemide (LASIX) 20 MG tablet he has been off this for a month now and since then his edema has gone up to a plus 2  Please call to advice and ok to leave message on secure line    Thank you

## 2017-06-06 PROBLEM — C45.7: Status: ACTIVE | Noted: 2017-01-01

## 2017-06-06 NOTE — PROGRESS NOTES
SUBJECTIVE:                                                    Oliver Mendoza is a 71 year old male who presents to clinic today for the following health issues:    Follow-up hospitalization for shortness of breath - diagnosis with pleural effusion and aggressive mesothelioma. DNR/DNI but wanting aggressive treatment.   Oxygen started. 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 Worked in [employer] for 40 years - chemical exposure. Going to speak with [facility] for consults.   Oxygen at 3L - 6L with activity. Here with son. No SUICIAL IDEATION OR HOMOCIDAL IDEATION OR CHRIS.  3 children. Lots of grandkids. Supportive. Breathing currently stable. No urine changes. No nausea, vomiting or diarrhea. No black or bloody stools.   Peripheral edema - issues - sleeping mostly in chair - working on getting chair. Limited sodium. No wrapping.   Wife  14 years ago. Emotionally doing ok.   Lift chair prescription needed.    Home health care involved. Hospice in future possible. Family would like walker/wheelchair and lift chair.      REASON FOR HOSPITALIZATION  Progressive shortness of breath since 2017, accelerated over the last 2 to 3 weeks.  PRIMARY HOSPITAL DIAGNOSES   1. Acute hypoxic respiratory failure related to mesothelioma due to massive   right pleural effusion and collapsed right lung. After right thoracentesis the   lung failed to expand and R hydropneumothorax developed.  - DCed on oxygen 3 liters at rest, 6 liters with activity; desaturates quickly with activity.   2. New diagnosis of desmoplastic malignant mesothelioma.  - Aggressive form of mesothelioma.  - Without chemotherapy, prognosis 6 to 12 months.  - With chemotherapy prognosis 14 months.  - Refer to [facility] oncology for possible trial treatments.   3. Recurrent right pleural effusion hydropneumothorax.  - PleurX will be done as outpatient  - Need to allow fluid to reaccumulate and hold aspirin.   SECONDARY DIAGNOSES  1. Type 2  diabetes.   2. Hypertension.   3. Alcoholism history.   - Primarily significant for a long history of poor coping skills;   - Even at baseline easily frustrated and overwhelmed.   - Clearly exacerbated by anticipation anxiety this hospitalization.  4. Palliative care consult.  - DNR/DNI code status established.  - Hospice Consult: currently not a candidate, given desire for aggressive treatment.   - Family is very supportive.   5. Constipation.  CONSULTS THIS HOSPITALIZATION   1. Dr. Ibrahim of oncology.   2. Palliative care.   3. Pulmonology   PROCEDURES THIS HOSPITALIZATION   1. Right thoracentesis, 05/21, 1800 cc. (cytology negative)  2. Pleural biopsy 5/23. (cytology positive for cancer)  3. Right thoracentesis 05/25, 600 cc.       Hospital Follow-up Visit:    [facility]ursing [facility]  Date of Admission: 05-  Date of Discharge: 05-  Reason(s) for Admission: Breathing             Problems taking medications regularly:  None       Medication changes since discharge: None       Problems adhering to non-medication therapy:  None    Summary of hospitalization:  CareEverywhere information obtained and reviewed  Diagnostic Tests/Treatments reviewed.  Follow up needed: [place]  Other Healthcare Providers Involved in Patient s Care:         Homecare  Update since discharge: stable.     Post Discharge Medication Reconciliation: discharge medications reconciled and changed, per note/orders (see AVS).  Plan of care communicated with patient and family     Coding guidelines for this visit:  Type of Medical   Decision Making Face-to-Face Visit       within 7 Days of discharge Face-to-Face Visit        within 14 days of discharge   Moderate Complexity [id number]   High Complexity [id number]            PROBLEMS TO ADD ON...    Problem list and histories reviewed & adjusted, as indicated.  Additional history: as documented    Patient Active Problem List   Diagnosis     Advanced directives,  counseling/discussion     Type 2 diabetes, HbA1C goal < 8% (H)     Hyperlipidemia LDL goal <100     Hypertension goal BP (blood pressure) < 140/90     Leg swelling     Ganglion cyst of wrist     CTS (carpal tunnel syndrome)     Meibomitis     Cataract, nuclear     Microhematuria     Health Care Home     ARF (acute renal failure) (H)     Kidney stones     Skin lesion     Pain in joint, shoulder region     Pain in thoracic spine     History of basal cell carcinoma     ED (erectile dysfunction)     Cervical radiculopathy     Type 2 diabetes mellitus with diabetic nephropathy (H)     Erectile dysfunction due to arterial insufficiency     Calculus of kidney     Anemia due to other cause     Essential hypertension with goal blood pressure less than 140/90     Type 2 diabetes mellitus with diabetic nephropathy, without long-term current use of insulin (H)     Mesothelioma of lung (H)     Past Surgical History:   Procedure Laterality Date     CHOLECYSTECTOMY  1992     EXTRACORPOREAL SHOCK WAVE LITHOTRIPSY (ESWL)       HERNIA REPAIR  1985    Umbilical hernia       Social History   Substance Use Topics     Smoking status: Former Smoker     Packs/day: 0.50     Types: Cigarettes     Quit date: 9/3/2013     Smokeless tobacco: Never Used      Comment: Lives in smoke free household     Alcohol use 0.0 oz/week     0 Standard drinks or equivalent per week      Comment:  one liter a week huiey     Family History   Problem Relation Age of Onset     CANCER Mother      breast cancer - 70's     DIABETES Mother      Hypertension Mother      DIABETES Father      Hypertension Father      DIABETES Brother      Hypertension Brother      CEREBROVASCULAR DISEASE No family hx of      Thyroid Disease No family hx of      Glaucoma No family hx of      Macular Degeneration No family hx of            Reviewed and updated as needed this visit by clinical staff  Tobacco  Allergies       Reviewed and updated as needed this visit by Provider          ROS:  C: NEGATIVE for fever, chills, change in weight  INTEGUMENTARY/SKIN: NEGATIVE for worrisome rashes, moles or lesions  E/M: NEGATIVE for ear, mouth and throat problems  CV: NEGATIVE for chest pain, palpitations or peripheral edema  GI: NEGATIVE for nausea, abdominal pain, heartburn, or change in bowel habits  : negative for dysuria, hematuria, decreased urinary stream, erectile dysfunction  NEURO: NEGATIVE for weakness, dizziness or paresthesias  ENDOCRINE: NEGATIVE for temperature intolerance, skin/hair changes  HEME/ALLERGY/IMMUNE: NEGATIVE for bleeding problems  PSYCHIATRIC: insomnia    OBJECTIVE:                                                    /69  Pulse 101  Temp 97.5  F (36.4  C) (Oral)  Wt 240 lb (108.9 kg)  SpO2 91%  BMI 36.49 kg/m2  Body mass index is 36.49 kg/(m^2).  GENERAL: healthy, alert and no distress  EYES: Eyes grossly normal to inspection, PERRL and conjunctivae and sclerae normal  HENT: ear canals and TM's normal, nose and mouth without ulcers or lesions  NECK: no adenopathy, no asymmetry, masses, or scars and thyroid normal to palpation  RESP: bilateral basilar crackles. right > left, good overall airflow  CV: regular rate and rhythm, normal S1 S2, no S3 or S4, no murmur, click or rub, no peripheral edema and peripheral pulses strong  ABDOMEN: soft, nontender, no hepatosplenomegaly, no masses and bowel sounds normal  MS: bilateral +1 LOWER EXTREMETIES edema  SKIN: no suspicious lesions or rashes  NEURO: Normal strength and tone, mentation intact and speech normal  PSYCH: mentation appears normal, affect normal/bright         ASSESSMENT/PLAN:                                                    ASSESSMENT / PLAN:  (C45.7) Mesothelioma of lung (H)  (primary encounter diagnosis)  Comment: aggressive. Quit smoking just 3 months ago.  Plan: order for DME, order for DME, order for DME        DNR/DNI and likely hospice in future. Patient to see [place]. Supportive family.  Prescriptions walker/wheelchair. Handicap from    (M62.81) Generalized muscle weakness  Comment: from low oxygen  Plan: order for DME, order for DME, order for DME        See above    (R60.9) Peripheral edema  Comment: likely from sleep in chair/inactive  Plan: double lasix prn. Elevate and wrap - ace/coban done by my MA.     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     (I10) Hypertension goal BP (blood pressure) < 140/90  Comment: stable  Plan: furosemide (LASIX) 20 MG tablet        Recheck in 3 months      Heriberto Lundy MD  [facility]

## 2017-06-06 NOTE — NURSING NOTE
"Chief Complaint   Patient presents with     Hospital F/U       Initial /69  Pulse 101  Temp 97.5  F (36.4  C) (Oral)  Wt 240 lb (108.9 kg)  SpO2 91%  BMI 36.49 kg/m2 Estimated body mass index is 36.49 kg/(m^2) as calculated from the following:    Height as of 5/11/17: 5' 8\" (1.727 m).    Weight as of this encounter: 240 lb (108.9 kg).  Medication Reconciliation: complete   Katya Caban CMA    "

## 2017-06-06 NOTE — NURSING NOTE
The following medication was given:     MEDICATION: Toradol 60 mg  ROUTE: IM  SITE: [facility]  DOSE: 2 ml  LOT #: [id number]  :    EXPIRATION DATE:    NDC#: 29581-455-27  Katya Caban CMA

## 2017-06-06 NOTE — TELEPHONE ENCOUNTER
Son, Timi hobbs on behalf of Oliver. He was seen in clinic today and had his leg wrapped for fluid retention. They are wondering how long they should keep them wrapped.

## 2017-06-06 NOTE — MR AVS SNAPSHOT
"              After Visit Summary   6/6/2017    Oliver Mendoza    MRN: [id number]           Patient Information     Date Of Birth          1945        Visit Information        Provider Department      6/6/2017 10:10 AM Heriberto Lundy MD [facility]        Today's Diagnoses     Mesothelioma of lung (H)    -  1    Generalized muscle weakness        Peripheral edema        Type 2 diabetes mellitus with diabetic nephropathy, without long-term current use of insulin (H)        SOB (shortness of breath)        Other insomnia        Leg swelling        Hypertension goal BP (blood pressure) < 140/90           Follow-ups after your visit        Who to contact     If you have questions or need follow up information about today's clinic visit or your schedule please contact [facility] directly at [phone].  Normal or non-critical lab and imaging results will be communicated to you by [employer], letter or phone within 4 business days after the clinic has received the results. If you do not hear from us within 7 days, please contact the clinic through [employer] or phone. If you have a critical or abnormal lab result, we will notify you by phone as soon as possible.  Submit refill requests through [employer] or call your pharmacy and they will forward the refill request to us. Please allow 3 business days for your refill to be completed.          Additional Information About Your Visit        [employer] Information     Phanfare lets you send messages to your doctor, view your test results, renew your prescriptions, schedule appointments and more. To sign up, go to [email or web address] . Click on \"Log in\" on the left side of the screen, which will take you to the Welcome page. Then click on \"Sign up Now\" on the right side of the page.     You will be asked to enter the access code listed below, as well as some personal information. Please follow the directions to create your username and password.   "   Your access code is: [email or web address]  Expires: 2017 10:45 AM     Your access code will  in 90 days. If you need help or a new code, please call your [facility] clinic or [phone].        Care EveryWhere ID     This is your Care EveryWhere ID. This could be used by other organizations to access your [facility] medical records  [id number]        Your Vitals Were     Pulse Temperature Pulse Oximetry BMI (Body Mass Index)          101 97.5  F (36.4  C) (Oral) 91% 36.49 kg/m2         Blood Pressure from Last 3 Encounters:   17 117/69   17 114/46   17 116/64    Weight from Last 3 Encounters:   17 240 lb (108.9 kg)   17 249 lb (112.9 kg)   17 255 lb (115.7 kg)              Today, you had the following     No orders found for display         Today's Medication Changes          These changes are accurate as of: 17 10:46 AM.  If you have any questions, ask your nurse or doctor.               Start taking these medicines.        Dose/Directions    * order for DME   Used for:  Mesothelioma of lung (H), Generalized muscle weakness, SOB (shortness of breath)   Started by:  Heriberto Lundy MD        walker   Quantity:  1 each   Refills:  0       * order for DME   Used for:  Mesothelioma of lung (H), Generalized muscle weakness, SOB (shortness of breath)   Started by:  Heriberto Lundy MD        wheelchair   Quantity:  1 each   Refills:  0       * order for DME   Used for:  SOB (shortness of breath), Generalized muscle weakness, Mesothelioma of lung (H)   Started by:  Heriberto Lundy MD        Lift chair   Quantity:  1 each   Refills:  0       zolpidem 5 MG tablet   Commonly known as:  AMBIEN   Used for:  Other insomnia   Started by:  Heriberto Lundy MD        Dose:  5 mg   Take 1 tablet (5 mg) by mouth nightly as needed for sleep   Quantity:  15 tablet   Refills:  2       * Notice:  This list has 3 medication(s) that are the same as other medications prescribed for you.  Read the directions carefully, and ask your doctor or other care provider to review them with you.      These medicines have changed or have updated prescriptions.        Dose/Directions    furosemide 20 MG tablet   Commonly known as:  LASIX   This may have changed:    - how much to take  - how to take this  - when to take this  - additional instructions   Used for:  Leg swelling, Hypertension goal BP (blood pressure) < 140/90   Changed by:  Heriberto Lundy MD        1-2 tabs In am with breakfast for leg swelling/blood pressure   Quantity:  60 tablet   Refills:  2            Where to get your medicines      These medications were sent to [place], [place] - [address] [facility] [address], [address]  [address] [facility] [address], [place] [facility] [address]     Phone:  [phone]     furosemide 20 MG tablet         Some of these will need a paper prescription and others can be bought over the counter.  Ask your nurse if you have questions.     Bring a paper prescription for each of these medications     order for DME    order for DME    order for DME    zolpidem 5 MG tablet                Primary Care Provider Office Phone # Fax #    Heriberto Lundy [phone]       [facility] [address] [facility] [address]        Thank you!     Thank you for choosing [facility]  for your care. Our goal is always to provide you with excellent care. Hearing back from our patients is one way we can continue to improve our services. Please take a few minutes to complete the written survey that you may receive in the mail after your visit with us. Thank you!             Your Updated Medication List - Protect others around you: Learn how to safely use, store and throw away your medicines at www.disposemymeds.org.          This list is accurate as of: 6/6/17 10:46 AM.  Always use your most recent med list.                   Brand Name Dispense Instructions for use    albuterol 108  (90 BASE) MCG/ACT Inhaler    PROAIR HFA/PROVENTIL HFA/VENTOLIN HFA    1 Inhaler    Inhale 2 puffs into the lungs every 6 hours as needed for shortness of breath / dyspnea or wheezing       allopurinol 100 MG tablet    ZYLOPRIM    90 tablet    Take 1 tablet (100 mg) by mouth daily To lower uric acid/prevent kidney stones Pharmacy ok to hold prescription until due       aspirin 81 MG tablet      Take 1 tablet by mouth daily.       bisacodyl 10 MG Suppository    DULCOLAX     Place 10 mg rectally daily as needed for constipation       docusate sodium 100 MG tablet    COLACE     Take 100 mg by mouth 2 times daily       fish oil-omega-3 fatty acids 1000 MG capsule      Take 3 g by mouth daily.       furosemide 20 MG tablet    LASIX    60 tablet    1-2 tabs In am with breakfast for leg swelling/blood pressure       GLUCOSAMINE SULFATE PO      Take 1,500 mg by mouth 2 times daily       lisinopril 10 MG tablet    PRINIVIL/ZESTRIL    45 tablet    1/2 pill = 5mg In AM for blood pressure Pharmacy ok to hold prescription until due       metFORMIN 750 MG 24 hr tablet    GLUCOPHAGE-XR    90 tablet    Take 1 tablet (750 mg) by mouth daily (with breakfast) For diabetes Pharmacy ok to hold prescription until due       Multiple Vitamin-Folic Acid Tabs      Take 0.4 mg by mouth daily       * order for DME     1 each    walker       * order for DME     1 each    wheelchair       * order for DME     1 each    Lift chair       oxyCODONE-acetaminophen 5-325 MG per tablet    PERCOCET    50 tablet    Take 1 tablet by mouth 2 times daily as needed for moderate to severe pain Max#50/month       simvastatin 10 MG tablet    ZOCOR    90 tablet    Take 1 tablet (10 mg) by mouth At Bedtime For cholesterol Pharmacy ok to hold prescription until due       STOOL SOFTENER PO      1-2 tabs bid as needed for constipation       zolpidem 5 MG tablet    AMBIEN    15 tablet    Take 1 tablet (5 mg) by mouth nightly as needed for sleep       * Notice:  This  list has 3 medication(s) that are the same as other medications prescribed for you. Read the directions carefully, and ask your doctor or other care provider to review them with you.

## 2017-06-12 NOTE — TELEPHONE ENCOUNTER
Reason for Call:  Form, our goal is to have forms completed with 72 hours, however, some forms may require a visit or additional information.    Type of letter, form or note:  application for disability parking    Who is the form from?: Patient    Where did the form come from: Patient or family brought in       What clinic location was the form placed at?: [place]    Where the form was placed: 's Box    What number is listed as a contact on the form?: [phone]       Additional comments: call patient when complete/ or any questions    Call taken on 6/12/2017 at 5:26 PM by Dior Cortes

## 2017-06-12 NOTE — TELEPHONE ENCOUNTER
Patient's son Timi calling, saying that patient's handicap parking form filled out wrong.    Provider marked that there was a medical reason that patient didn't drive, patient is very able and does drive.    Son will drop of new form to be completed at .    Please call Timi at [phone] when ready    Janette Gorman,

## 2017-06-13 NOTE — TELEPHONE ENCOUNTER
Called and spoke to patient and informed him of Dr Lundy's message. I do not see an NELSON to speak with a family member. Brought form to the  for .Harper Anderson MA/FADI

## 2017-06-13 NOTE — TELEPHONE ENCOUNTER
Please call. I filled out form about but I'd like family and patient to know if he has increased weakness/fatigue he should NOT be driving and I'd prefer family drive as much as possible.

## 2017-06-22 NOTE — TELEPHONE ENCOUNTER
Patient diagnosed with Mesothelioma at [facility]. Needs hospice care immediately. [facility] needs orders right away as they have an appt. With hospice tomorrow. Please fax to [phone].

## 2017-06-23 NOTE — TELEPHONE ENCOUNTER
Hanna is calling to let provider know patient enrolled in [facility] hospice today. Thank you.

## 2017-07-06 NOTE — TELEPHONE ENCOUNTER
Increased lower leg adema (4+) He's taking lasix 40mg, seems to help some.  Would like to try xaroxlyn for a couple of days?

## 2017-07-18 NOTE — TELEPHONE ENCOUNTER
Son, Richard, looking for some suggestions. 3 siblings, brother has autoimmune so difficulty to help move patient. Also sister unable to move patient. So Richard is only one who can take care of patient, help change patient, etc. Patient is on hospice, also on home care. Wondering of other housing options?  Patient has a catheter, refusing medication. Cannot stand up by self anymore. Does have a lift chair at home, having mayra lift being delivered to home today. Wondering if needing a referral to get patient into a nursing home. Please advise.    Angela GARCIAN, RN, CPN

## 2017-07-18 NOTE — TELEPHONE ENCOUNTER
Clinic Care Coordination Contact  OUTREACH    Pt is enrolled in [facility] [phone]. Left a message with his SW, Carleen Copelandilana requesting she address concerns. Hospice can assist with nursing home placement or provide resources for 24 hour in home care. Provided SW CC contact information should hospice SW have any additional questions or need coordination from the clinic. No consent to communicate on file for juan manuel Ramos can not provide update.     NAZ Hansen  [facility]   [phone]  [email or web address]

## 2017-07-18 NOTE — TELEPHONE ENCOUNTER
I'm not sure our care coordination has been involved or if patient has social work but usually hospice is good at these things too. I'm ok with group home. Can we as our care coordination for help with this? Thanks.   Heriberto Lundy MD

## 2017-07-18 NOTE — TELEPHONE ENCOUNTER
Spouse states Angel, patient's son, needs some advice on end of life care.  Please call.    Thank you.

## 2022-04-06 NOTE — PROGRESS NOTES
SUBJECTIVE:  Oliver Mendoza, a 71 year old male scheduled an appointment to discuss the following issues:  Shortness of breath/cough  some ankle edema. No pain. Had cellulitis from scrap last month - diagnosis cellultis - given keflex.   No history dvt. No long car or plane rides..  No fevers or chills. Mild cough. Wheezing a lot. Seen 2 months ago and prednisone/MDI given - helpful.  Inhaler somewhat helpful. No chest pain. No urine changes.   Past Medical History:   Diagnosis Date     Actinic keratosis      ARF (acute renal failure) (H) 8/23/2012     Carpal tunnel syndrome 15 yrs    Wrist splint for the night     Cataract, nuclear 11/15/2011     Ganglion cyst     Dorum of the right wrist     Hemorrhoids      Hyperlipidemia      Hypertension      Type II or unspecified type diabetes mellitus without mention of complication, not stated as uncontrolled 2005    Taking medication for last one year       Past Surgical History:   Procedure Laterality Date     CHOLECYSTECTOMY  1992     EXTRACORPOREAL SHOCK WAVE LITHOTRIPSY (ESWL)       HERNIA REPAIR  1985    Umbilical hernia       Family History   Problem Relation Age of Onset     CANCER Mother      breast cancer - 70's     DIABETES Mother      Hypertension Mother      DIABETES Father      Hypertension Father      DIABETES Brother      Hypertension Brother      CEREBROVASCULAR DISEASE No family hx of      Thyroid Disease No family hx of      Glaucoma No family hx of      Macular Degeneration No family hx of        Social History   Substance Use Topics     Smoking status: Former Smoker     Packs/day: 0.50     Types: Cigarettes     Quit date: 9/3/2013     Smokeless tobacco: Never Used      Comment: Lives in smoke free household     Alcohol use 0.0 oz/week     0 Standard drinks or equivalent per week      Comment:  one liter a week anna       ROS:    OBJECTIVE:  /64  Pulse 88  Temp 97  F (36.1  C) (Oral)  Wt 255 lb (115.7 kg)  SpO2 (!) 86%  BMI 38.77  kg/m2  EXAM:  GENERAL APPEARANCE: healthy, alert and no distress  NECK: no adenopathy, no asymmetry, masses, or scars and thyroid normal to palpation  RESP: decreased lung sounds and some crackles left base.   CV: regular rates and rhythm, normal S1 S2, no S3 or S4 and no murmur, click or rub -  ABDOMEN:  soft, non-tender,  no HSM or masses and bowel sounds normal  MS: mild swelling right ankle. No calf tenderness/swelling.   SKIN: dermatitis changes/redness right shin. No warmth/tenderness.   NEURO: Normal strength and tone, sensory exam grossly normal, mentation intact and speech normal  PSYCH: mentation appears normal and affect normal/bright    ASSESSMENT / PLAN:  (R05) Cough  (primary encounter diagnosis)  Comment: likely from pneumonia/ left pleural effusion  Plan: XR Chest 2 Views, levofloxacin (LEVAQUIN) 500         MG tablet        Reveiwed risks and side effects of medication  Stop if calf pain. Prn albuterol. Consider prednisone but not much wheezing heard. To ER if worsening shortness of breath/ chest pain.   Return to clinic in 1-2 weeks. Sooner if needed. Call/email with questions/concerns. Expected course and warning signs reviewed.   Will need repeat xray in future and possibly ct scan if not improving given x-smoker.     (R06.2) Wheezing  Comment: not bad today  Plan: XR Chest 2 Views        See above.     (M79.89) Leg swelling  Comment: mild ankle. Lasix prn helpful in past. Recent cellulitis but no history dvt/travel  Plan: furosemide (LASIX) 20 MG tablet        Consider u/s if not improving. To ER if worsening swelling - especially into calf/thigh. levaquin should cover cellulitis. Elevated/ace prn. Prn lasix - can HOLD lisinopril while taking but get more potassium in diet - fruits/veggies. Return to clinic 1-2 weeks repeat labs    (I10) Hypertension goal BP (blood pressure) < 140/90  Comment: stable  Plan: furosemide (LASIX) 20 MG tablet        See above. Chest pain or worsening shortness of  breath to marco Lundy     [employer]